# Patient Record
Sex: FEMALE | Race: BLACK OR AFRICAN AMERICAN | NOT HISPANIC OR LATINO | Employment: FULL TIME | URBAN - METROPOLITAN AREA
[De-identification: names, ages, dates, MRNs, and addresses within clinical notes are randomized per-mention and may not be internally consistent; named-entity substitution may affect disease eponyms.]

---

## 2019-08-13 ENCOUNTER — HISTORICAL (OUTPATIENT)
Dept: LAB | Facility: HOSPITAL | Age: 39
End: 2019-08-13

## 2019-08-13 LAB
ABS NEUT (OLG): 3.86 X10(3)/MCL (ref 2.1–9.2)
ALBUMIN SERPL-MCNC: 4.1 GM/DL (ref 3.4–5)
ALBUMIN/GLOB SERPL: 1.3 RATIO (ref 1.1–2)
ALP SERPL-CCNC: 53 UNIT/L (ref 38–126)
ALT SERPL-CCNC: 20 UNIT/L (ref 12–78)
APPEARANCE, UA: CLEAR
AST SERPL-CCNC: 13 UNIT/L (ref 15–37)
BACTERIA SPEC CULT: ABNORMAL /HPF
BASOPHILS # BLD AUTO: 0 X10(3)/MCL (ref 0–0.2)
BASOPHILS NFR BLD AUTO: 0 %
BILIRUB SERPL-MCNC: 1.6 MG/DL (ref 0.2–1)
BILIRUB UR QL STRIP: NEGATIVE
BILIRUBIN DIRECT+TOT PNL SERPL-MCNC: 0.3 MG/DL (ref 0–0.5)
BILIRUBIN DIRECT+TOT PNL SERPL-MCNC: 1.3 MG/DL (ref 0–0.8)
BUN SERPL-MCNC: 10 MG/DL (ref 7–18)
CALCIUM SERPL-MCNC: 8.7 MG/DL (ref 8.5–10.1)
CHLORIDE SERPL-SCNC: 107 MMOL/L (ref 98–107)
CHOLEST SERPL-MCNC: 154 MG/DL (ref 0–200)
CHOLEST/HDLC SERPL: 2.8 {RATIO} (ref 0–4)
CO2 SERPL-SCNC: 27 MMOL/L (ref 21–32)
COLOR UR: YELLOW
CREAT SERPL-MCNC: 0.9 MG/DL (ref 0.55–1.02)
DEPRECATED CALCIDIOL+CALCIFEROL SERPL-MC: 16.39 NG/ML (ref 30–80)
EOSINOPHIL # BLD AUTO: 0.1 X10(3)/MCL (ref 0–0.9)
EOSINOPHIL NFR BLD AUTO: 1 %
ERYTHROCYTE [DISTWIDTH] IN BLOOD BY AUTOMATED COUNT: 13.1 % (ref 11.5–17)
EST. AVERAGE GLUCOSE BLD GHB EST-MCNC: 94 MG/DL
GLOBULIN SER-MCNC: 3.2 GM/DL (ref 2.4–3.5)
GLUCOSE (UA): NEGATIVE
GLUCOSE SERPL-MCNC: 86 MG/DL (ref 74–106)
HBA1C MFR BLD: 4.9 % (ref 4.2–6.3)
HCT VFR BLD AUTO: 41.4 % (ref 37–47)
HDLC SERPL-MCNC: 56 MG/DL (ref 35–60)
HGB BLD-MCNC: 13.4 GM/DL (ref 12–16)
HGB UR QL STRIP: NEGATIVE
KETONES UR QL STRIP: ABNORMAL
LDLC SERPL CALC-MCNC: 88 MG/DL (ref 0–129)
LEUKOCYTE ESTERASE UR QL STRIP: NEGATIVE
LYMPHOCYTES # BLD AUTO: 2.2 X10(3)/MCL (ref 0.6–4.6)
LYMPHOCYTES NFR BLD AUTO: 34 %
MCH RBC QN AUTO: 29.3 PG (ref 27–31)
MCHC RBC AUTO-ENTMCNC: 32.4 GM/DL (ref 33–36)
MCV RBC AUTO: 90.4 FL (ref 80–94)
MONOCYTES # BLD AUTO: 0.4 X10(3)/MCL (ref 0.1–1.3)
MONOCYTES NFR BLD AUTO: 7 %
NEUTROPHILS # BLD AUTO: 3.86 X10(3)/MCL (ref 2.1–9.2)
NEUTROPHILS NFR BLD AUTO: 58 %
NITRITE UR QL STRIP: NEGATIVE
PH UR STRIP: 8 [PH] (ref 5–9)
PLATELET # BLD AUTO: 295 X10(3)/MCL (ref 130–400)
PMV BLD AUTO: 9.9 FL (ref 9.4–12.4)
POTASSIUM SERPL-SCNC: 3.9 MMOL/L (ref 3.5–5.1)
PROT SERPL-MCNC: 7.3 GM/DL (ref 6.4–8.2)
PROT UR QL STRIP: NEGATIVE
RBC # BLD AUTO: 4.58 X10(6)/MCL (ref 4.2–5.4)
RBC #/AREA URNS HPF: 5 /HPF (ref 0–2)
SODIUM SERPL-SCNC: 140 MMOL/L (ref 136–145)
SP GR UR STRIP: 1.02 (ref 1–1.03)
SQUAMOUS EPITHELIAL, UA: 9 /HPF (ref 0–4)
TRIGL SERPL-MCNC: 48 MG/DL (ref 30–150)
TSH SERPL-ACNC: 1.17 MIU/L (ref 0.36–3.74)
UROBILINOGEN UR STRIP-ACNC: 1
VLDLC SERPL CALC-MCNC: 10 MG/DL
WBC # SPEC AUTO: 6.7 X10(3)/MCL (ref 4.5–11.5)
WBC #/AREA URNS HPF: ABNORMAL /[HPF]

## 2020-09-17 ENCOUNTER — HISTORICAL (OUTPATIENT)
Dept: ADMINISTRATIVE | Facility: HOSPITAL | Age: 40
End: 2020-09-17

## 2020-09-17 LAB
ABS NEUT (OLG): 3.87 X10(3)/MCL (ref 2.1–9.2)
ALBUMIN SERPL-MCNC: 3.9 GM/DL (ref 3.5–5)
ALBUMIN/GLOB SERPL: 1.6 RATIO (ref 1.1–2)
ALP SERPL-CCNC: 50 UNIT/L (ref 40–150)
ALT SERPL-CCNC: 16 UNIT/L (ref 0–55)
APPEARANCE, UA: CLEAR
AST SERPL-CCNC: 15 UNIT/L (ref 5–34)
BACTERIA SPEC CULT: ABNORMAL /HPF
BASOPHILS # BLD AUTO: 0 X10(3)/MCL (ref 0–0.2)
BASOPHILS NFR BLD AUTO: 0 %
BILIRUB SERPL-MCNC: 1 MG/DL
BILIRUB UR QL STRIP: NEGATIVE
BILIRUBIN DIRECT+TOT PNL SERPL-MCNC: 0.4 MG/DL (ref 0–0.5)
BILIRUBIN DIRECT+TOT PNL SERPL-MCNC: 0.6 MG/DL (ref 0–0.8)
BUN SERPL-MCNC: 14 MG/DL (ref 7–18.7)
CALCIUM SERPL-MCNC: 8.6 MG/DL (ref 8.4–10.2)
CHLORIDE SERPL-SCNC: 106 MMOL/L (ref 98–107)
CHOLEST SERPL-MCNC: 131 MG/DL
CHOLEST/HDLC SERPL: 3 {RATIO} (ref 0–5)
CO2 SERPL-SCNC: 30 MMOL/L (ref 22–29)
COLOR UR: YELLOW
CREAT SERPL-MCNC: 0.8 MG/DL (ref 0.55–1.02)
DEPRECATED CALCIDIOL+CALCIFEROL SERPL-MC: 19.5 NG/ML (ref 6.6–49.9)
EOSINOPHIL # BLD AUTO: 0 X10(3)/MCL (ref 0–0.9)
EOSINOPHIL NFR BLD AUTO: 1 %
ERYTHROCYTE [DISTWIDTH] IN BLOOD BY AUTOMATED COUNT: 13.2 % (ref 11.5–17)
EST. AVERAGE GLUCOSE BLD GHB EST-MCNC: 96.8 MG/DL
GLOBULIN SER-MCNC: 2.4 GM/DL (ref 2.4–3.5)
GLUCOSE (UA): NEGATIVE
GLUCOSE SERPL-MCNC: 84 MG/DL (ref 74–100)
HBA1C MFR BLD: 5 %
HCT VFR BLD AUTO: 39.3 % (ref 37–47)
HDLC SERPL-MCNC: 43 MG/DL (ref 35–60)
HGB BLD-MCNC: 12.7 GM/DL (ref 12–16)
HGB UR QL STRIP: NEGATIVE
KETONES UR QL STRIP: NEGATIVE
LDLC SERPL CALC-MCNC: 76 MG/DL (ref 50–140)
LEUKOCYTE ESTERASE UR QL STRIP: NEGATIVE
LYMPHOCYTES # BLD AUTO: 1.7 X10(3)/MCL (ref 0.6–4.6)
LYMPHOCYTES NFR BLD AUTO: 28 %
MCH RBC QN AUTO: 29.5 PG (ref 27–31)
MCHC RBC AUTO-ENTMCNC: 32.3 GM/DL (ref 33–36)
MCV RBC AUTO: 91.4 FL (ref 80–94)
MONOCYTES # BLD AUTO: 0.5 X10(3)/MCL (ref 0.1–1.3)
MONOCYTES NFR BLD AUTO: 8 %
NEUTROPHILS # BLD AUTO: 3.87 X10(3)/MCL (ref 2.1–9.2)
NEUTROPHILS NFR BLD AUTO: 63 %
NITRITE UR QL STRIP: NEGATIVE
PH UR STRIP: 7 [PH] (ref 5–9)
PLATELET # BLD AUTO: 276 X10(3)/MCL (ref 130–400)
PMV BLD AUTO: 9.6 FL (ref 9.4–12.4)
POTASSIUM SERPL-SCNC: 3.7 MMOL/L (ref 3.5–5.1)
PROT SERPL-MCNC: 6.3 GM/DL (ref 6.4–8.3)
PROT UR QL STRIP: NEGATIVE
RBC # BLD AUTO: 4.3 X10(6)/MCL (ref 4.2–5.4)
RBC #/AREA URNS HPF: ABNORMAL /[HPF]
SODIUM SERPL-SCNC: 138 MMOL/L (ref 136–145)
SP GR UR STRIP: 1.02 (ref 1–1.03)
SQUAMOUS EPITHELIAL, UA: ABNORMAL
TRIGL SERPL-MCNC: 62 MG/DL (ref 37–140)
TSH SERPL-ACNC: 0.63 UIU/ML (ref 0.35–4.94)
UROBILINOGEN UR STRIP-ACNC: 1
VLDLC SERPL CALC-MCNC: 12 MG/DL
WBC # SPEC AUTO: 6.2 X10(3)/MCL (ref 4.5–11.5)
WBC #/AREA URNS HPF: ABNORMAL /[HPF]

## 2021-09-13 ENCOUNTER — HISTORICAL (OUTPATIENT)
Dept: ADMINISTRATIVE | Facility: HOSPITAL | Age: 41
End: 2021-09-13

## 2021-09-13 LAB
ABS NEUT (OLG): 4.34 X10(3)/MCL (ref 2.1–9.2)
ALBUMIN SERPL-MCNC: 4.5 GM/DL (ref 3.5–5)
ALBUMIN/GLOB SERPL: 1.5 RATIO (ref 1.1–2)
ALP SERPL-CCNC: 56 UNIT/L (ref 40–150)
ALT SERPL-CCNC: 10 UNIT/L (ref 0–55)
APPEARANCE, UA: CLEAR
AST SERPL-CCNC: 17 UNIT/L (ref 5–34)
BACTERIA SPEC CULT: ABNORMAL /HPF
BASOPHILS # BLD AUTO: 0 X10(3)/MCL (ref 0–0.2)
BASOPHILS NFR BLD AUTO: 0 %
BILIRUB SERPL-MCNC: 2.3 MG/DL
BILIRUB UR QL STRIP: NEGATIVE
BILIRUBIN DIRECT+TOT PNL SERPL-MCNC: 0.7 MG/DL (ref 0–0.5)
BILIRUBIN DIRECT+TOT PNL SERPL-MCNC: 1.6 MG/DL (ref 0–0.8)
BUN SERPL-MCNC: 12.4 MG/DL (ref 7–18.7)
CALCIUM SERPL-MCNC: 9.1 MG/DL (ref 8.4–10.2)
CHLORIDE SERPL-SCNC: 103 MMOL/L (ref 98–107)
CHOLEST SERPL-MCNC: 167 MG/DL
CHOLEST/HDLC SERPL: 3 {RATIO} (ref 0–5)
CO2 SERPL-SCNC: 25 MMOL/L (ref 22–29)
COLOR UR: YELLOW
CREAT SERPL-MCNC: 0.84 MG/DL (ref 0.55–1.02)
DEPRECATED CALCIDIOL+CALCIFEROL SERPL-MC: 22.6 NG/ML (ref 30–80)
EOSINOPHIL # BLD AUTO: 0 X10(3)/MCL (ref 0–0.9)
EOSINOPHIL NFR BLD AUTO: 0 %
ERYTHROCYTE [DISTWIDTH] IN BLOOD BY AUTOMATED COUNT: 13.1 % (ref 11.5–17)
EST. AVERAGE GLUCOSE BLD GHB EST-MCNC: 91.1 MG/DL
GLOBULIN SER-MCNC: 3.1 GM/DL (ref 2.4–3.5)
GLUCOSE (UA): NEGATIVE
GLUCOSE SERPL-MCNC: 84 MG/DL (ref 74–100)
HBA1C MFR BLD: 4.8 %
HCT VFR BLD AUTO: 41.7 % (ref 37–47)
HDLC SERPL-MCNC: 53 MG/DL (ref 35–60)
HGB BLD-MCNC: 13.4 GM/DL (ref 12–16)
HGB UR QL STRIP: ABNORMAL
KETONES UR QL STRIP: ABNORMAL
LDLC SERPL CALC-MCNC: 104 MG/DL (ref 50–140)
LEUKOCYTE ESTERASE UR QL STRIP: ABNORMAL
LYMPHOCYTES # BLD AUTO: 1.5 X10(3)/MCL (ref 0.6–4.6)
LYMPHOCYTES NFR BLD AUTO: 23 %
MCH RBC QN AUTO: 29.5 PG (ref 27–31)
MCHC RBC AUTO-ENTMCNC: 32.1 GM/DL (ref 33–36)
MCV RBC AUTO: 91.9 FL (ref 80–94)
MONOCYTES # BLD AUTO: 0.5 X10(3)/MCL (ref 0.1–1.3)
MONOCYTES NFR BLD AUTO: 7 %
NEUTROPHILS # BLD AUTO: 4.34 X10(3)/MCL (ref 2.1–9.2)
NEUTROPHILS NFR BLD AUTO: 69 %
NITRITE UR QL STRIP: NEGATIVE
PH UR STRIP: 6.5 [PH] (ref 5–9)
PLATELET # BLD AUTO: 335 X10(3)/MCL (ref 130–400)
PMV BLD AUTO: 9.9 FL (ref 9.4–12.4)
POTASSIUM SERPL-SCNC: 3.8 MMOL/L (ref 3.5–5.1)
PROT SERPL-MCNC: 7.6 GM/DL (ref 6.4–8.3)
PROT UR QL STRIP: NEGATIVE
RBC # BLD AUTO: 4.54 X10(6)/MCL (ref 4.2–5.4)
RBC #/AREA URNS HPF: ABNORMAL /[HPF]
SODIUM SERPL-SCNC: 136 MMOL/L (ref 136–145)
SP GR UR STRIP: 1.02 (ref 1–1.03)
SQUAMOUS EPITHELIAL, UA: ABNORMAL /HPF (ref 0–4)
TRIGL SERPL-MCNC: 48 MG/DL (ref 37–140)
TSH SERPL-ACNC: 0.71 UIU/ML (ref 0.35–4.94)
UROBILINOGEN UR STRIP-ACNC: 0.2
VLDLC SERPL CALC-MCNC: 10 MG/DL
WBC # SPEC AUTO: 6.3 X10(3)/MCL (ref 4.5–11.5)
WBC #/AREA URNS HPF: 5 /HPF (ref 0–3)

## 2021-09-20 ENCOUNTER — HISTORICAL (OUTPATIENT)
Dept: ADMINISTRATIVE | Facility: HOSPITAL | Age: 41
End: 2021-09-20

## 2021-09-20 LAB
ALBUMIN SERPL-MCNC: 4.6 GM/DL (ref 3.5–5)
ALBUMIN/GLOB SERPL: 1.5 RATIO (ref 1.1–2)
ALP SERPL-CCNC: 54 UNIT/L (ref 40–150)
ALT SERPL-CCNC: 11 UNIT/L (ref 0–55)
APPEARANCE, UA: CLEAR
AST SERPL-CCNC: 17 UNIT/L (ref 5–34)
BACTERIA SPEC CULT: ABNORMAL /HPF
BILIRUB SERPL-MCNC: 1.6 MG/DL
BILIRUB UR QL STRIP: NEGATIVE
BILIRUBIN DIRECT+TOT PNL SERPL-MCNC: 0.5 MG/DL (ref 0–0.5)
BILIRUBIN DIRECT+TOT PNL SERPL-MCNC: 1.1 MG/DL (ref 0–0.8)
BUN SERPL-MCNC: 11.1 MG/DL (ref 7–18.7)
CALCIUM SERPL-MCNC: 9.5 MG/DL (ref 8.4–10.2)
CHLORIDE SERPL-SCNC: 103 MMOL/L (ref 98–107)
CO2 SERPL-SCNC: 25 MMOL/L (ref 22–29)
COLOR UR: YELLOW
CREAT SERPL-MCNC: 0.92 MG/DL (ref 0.55–1.02)
GLOBULIN SER-MCNC: 3.1 GM/DL (ref 2.4–3.5)
GLUCOSE (UA): NEGATIVE
GLUCOSE SERPL-MCNC: 84 MG/DL (ref 74–100)
HGB UR QL STRIP: NEGATIVE
KETONES UR QL STRIP: ABNORMAL
LEUKOCYTE ESTERASE UR QL STRIP: NEGATIVE
NITRITE UR QL STRIP: NEGATIVE
PH UR STRIP: 6 [PH] (ref 5–9)
POTASSIUM SERPL-SCNC: 3.6 MMOL/L (ref 3.5–5.1)
PROT SERPL-MCNC: 7.7 GM/DL (ref 6.4–8.3)
PROT UR QL STRIP: NEGATIVE
RBC #/AREA URNS HPF: ABNORMAL /[HPF]
SODIUM SERPL-SCNC: 138 MMOL/L (ref 136–145)
SP GR UR STRIP: 1.02 (ref 1–1.03)
SQUAMOUS EPITHELIAL, UA: ABNORMAL /HPF (ref 0–4)
UROBILINOGEN UR STRIP-ACNC: 0.2
WBC #/AREA URNS HPF: ABNORMAL /[HPF]

## 2022-04-10 ENCOUNTER — HISTORICAL (OUTPATIENT)
Dept: ADMINISTRATIVE | Facility: HOSPITAL | Age: 42
End: 2022-04-10

## 2022-04-26 VITALS
OXYGEN SATURATION: 98 % | WEIGHT: 134 LBS | HEIGHT: 64 IN | DIASTOLIC BLOOD PRESSURE: 86 MMHG | SYSTOLIC BLOOD PRESSURE: 116 MMHG | BODY MASS INDEX: 22.88 KG/M2

## 2022-09-21 ENCOUNTER — OFFICE VISIT (OUTPATIENT)
Dept: FAMILY MEDICINE | Facility: CLINIC | Age: 42
End: 2022-09-21
Payer: COMMERCIAL

## 2022-09-21 ENCOUNTER — LAB VISIT (OUTPATIENT)
Dept: LAB | Facility: HOSPITAL | Age: 42
End: 2022-09-21
Attending: FAMILY MEDICINE
Payer: COMMERCIAL

## 2022-09-21 VITALS
DIASTOLIC BLOOD PRESSURE: 74 MMHG | RESPIRATION RATE: 18 BRPM | TEMPERATURE: 98 F | WEIGHT: 141 LBS | BODY MASS INDEX: 24.98 KG/M2 | SYSTOLIC BLOOD PRESSURE: 110 MMHG | HEART RATE: 74 BPM | HEIGHT: 63 IN | OXYGEN SATURATION: 98 %

## 2022-09-21 DIAGNOSIS — Z00.00 WELLNESS EXAMINATION: Primary | ICD-10-CM

## 2022-09-21 DIAGNOSIS — Z00.00 WELLNESS EXAMINATION: ICD-10-CM

## 2022-09-21 DIAGNOSIS — E55.9 VITAMIN D DEFICIENCY: ICD-10-CM

## 2022-09-21 LAB
ALBUMIN SERPL-MCNC: 4.1 GM/DL (ref 3.5–5)
ALBUMIN/GLOB SERPL: 1.2 RATIO (ref 1.1–2)
ALP SERPL-CCNC: 57 UNIT/L (ref 40–150)
ALT SERPL-CCNC: 17 UNIT/L (ref 0–55)
APPEARANCE UR: CLEAR
AST SERPL-CCNC: 16 UNIT/L (ref 5–34)
BACTERIA #/AREA URNS AUTO: NORMAL /HPF
BASOPHILS # BLD AUTO: 0.02 X10(3)/MCL (ref 0–0.2)
BASOPHILS NFR BLD AUTO: 0.2 %
BILIRUB UR QL STRIP.AUTO: NEGATIVE MG/DL
BILIRUBIN DIRECT+TOT PNL SERPL-MCNC: 1.8 MG/DL
BUN SERPL-MCNC: 10.2 MG/DL (ref 7–18.7)
CALCIUM SERPL-MCNC: 9.1 MG/DL (ref 8.4–10.2)
CHLORIDE SERPL-SCNC: 102 MMOL/L (ref 98–107)
CHOLEST SERPL-MCNC: 147 MG/DL
CHOLEST/HDLC SERPL: 3 {RATIO} (ref 0–5)
CO2 SERPL-SCNC: 26 MMOL/L (ref 22–29)
COLOR UR AUTO: YELLOW
CREAT SERPL-MCNC: 0.81 MG/DL (ref 0.55–1.02)
EOSINOPHIL # BLD AUTO: 0.05 X10(3)/MCL (ref 0–0.9)
EOSINOPHIL NFR BLD AUTO: 0.6 %
ERYTHROCYTE [DISTWIDTH] IN BLOOD BY AUTOMATED COUNT: 13.1 % (ref 11.5–17)
EST. AVERAGE GLUCOSE BLD GHB EST-MCNC: 91.1 MG/DL
GFR SERPLBLD CREATININE-BSD FMLA CKD-EPI: >60 MLS/MIN/1.73/M2
GLOBULIN SER-MCNC: 3.3 GM/DL (ref 2.4–3.5)
GLUCOSE SERPL-MCNC: 81 MG/DL (ref 74–100)
GLUCOSE UR QL STRIP.AUTO: NEGATIVE MG/DL
HBA1C MFR BLD: 4.8 %
HCT VFR BLD AUTO: 40.8 % (ref 37–47)
HDLC SERPL-MCNC: 50 MG/DL (ref 35–60)
HGB BLD-MCNC: 13 GM/DL (ref 12–16)
IMM GRANULOCYTES # BLD AUTO: 0.01 X10(3)/MCL (ref 0–0.04)
IMM GRANULOCYTES NFR BLD AUTO: 0.1 %
KETONES UR QL STRIP.AUTO: NEGATIVE MG/DL
LDLC SERPL CALC-MCNC: 77 MG/DL (ref 50–140)
LEUKOCYTE ESTERASE UR QL STRIP.AUTO: NEGATIVE UNIT/L
LYMPHOCYTES # BLD AUTO: 2.18 X10(3)/MCL (ref 0.6–4.6)
LYMPHOCYTES NFR BLD AUTO: 24.1 %
MCH RBC QN AUTO: 28.8 PG (ref 27–31)
MCHC RBC AUTO-ENTMCNC: 31.9 MG/DL (ref 33–36)
MCV RBC AUTO: 90.3 FL (ref 80–94)
MONOCYTES # BLD AUTO: 0.59 X10(3)/MCL (ref 0.1–1.3)
MONOCYTES NFR BLD AUTO: 6.5 %
NEUTROPHILS # BLD AUTO: 6.2 X10(3)/MCL (ref 2.1–9.2)
NEUTROPHILS NFR BLD AUTO: 68.5 %
NITRITE UR QL STRIP.AUTO: NEGATIVE
PH UR STRIP.AUTO: 6.5 [PH]
PLATELET # BLD AUTO: 318 X10(3)/MCL (ref 130–400)
PMV BLD AUTO: 8.9 FL (ref 7.4–10.4)
POTASSIUM SERPL-SCNC: 3.7 MMOL/L (ref 3.5–5.1)
PROT SERPL-MCNC: 7.4 GM/DL (ref 6.4–8.3)
PROT UR QL STRIP.AUTO: NEGATIVE MG/DL
RBC # BLD AUTO: 4.52 X10(6)/MCL (ref 4.2–5.4)
RBC #/AREA URNS AUTO: NORMAL /HPF
RBC UR QL AUTO: ABNORMAL UNIT/L
SODIUM SERPL-SCNC: 137 MMOL/L (ref 136–145)
SP GR UR STRIP.AUTO: 1.01
SQUAMOUS #/AREA URNS AUTO: NORMAL /HPF
TRIGL SERPL-MCNC: 100 MG/DL (ref 37–140)
TSH SERPL-ACNC: 0.99 UIU/ML (ref 0.35–4.94)
UROBILINOGEN UR STRIP-ACNC: 0.2 MG/DL
VLDLC SERPL CALC-MCNC: 20 MG/DL
WBC # SPEC AUTO: 9 X10(3)/MCL (ref 4.5–11.5)
WBC #/AREA URNS AUTO: NORMAL /HPF

## 2022-09-21 PROCEDURE — 1160F RVW MEDS BY RX/DR IN RCRD: CPT | Mod: CPTII,,, | Performed by: FAMILY MEDICINE

## 2022-09-21 PROCEDURE — 3074F SYST BP LT 130 MM HG: CPT | Mod: CPTII,,, | Performed by: FAMILY MEDICINE

## 2022-09-21 PROCEDURE — 81001 URINALYSIS AUTO W/SCOPE: CPT

## 2022-09-21 PROCEDURE — 83036 HEMOGLOBIN GLYCOSYLATED A1C: CPT

## 2022-09-21 PROCEDURE — 3078F PR MOST RECENT DIASTOLIC BLOOD PRESSURE < 80 MM HG: ICD-10-PCS | Mod: CPTII,,, | Performed by: FAMILY MEDICINE

## 2022-09-21 PROCEDURE — 80061 LIPID PANEL: CPT

## 2022-09-21 PROCEDURE — 3078F DIAST BP <80 MM HG: CPT | Mod: CPTII,,, | Performed by: FAMILY MEDICINE

## 2022-09-21 PROCEDURE — 3008F BODY MASS INDEX DOCD: CPT | Mod: CPTII,,, | Performed by: FAMILY MEDICINE

## 2022-09-21 PROCEDURE — 85025 COMPLETE CBC W/AUTO DIFF WBC: CPT

## 2022-09-21 PROCEDURE — 1159F PR MEDICATION LIST DOCUMENTED IN MEDICAL RECORD: ICD-10-PCS | Mod: CPTII,,, | Performed by: FAMILY MEDICINE

## 2022-09-21 PROCEDURE — 1160F PR REVIEW ALL MEDS BY PRESCRIBER/CLIN PHARMACIST DOCUMENTED: ICD-10-PCS | Mod: CPTII,,, | Performed by: FAMILY MEDICINE

## 2022-09-21 PROCEDURE — 99396 PR PREVENTIVE VISIT,EST,40-64: ICD-10-PCS | Mod: ,,, | Performed by: FAMILY MEDICINE

## 2022-09-21 PROCEDURE — 36415 COLL VENOUS BLD VENIPUNCTURE: CPT

## 2022-09-21 PROCEDURE — 1159F MED LIST DOCD IN RCRD: CPT | Mod: CPTII,,, | Performed by: FAMILY MEDICINE

## 2022-09-21 PROCEDURE — 3008F PR BODY MASS INDEX (BMI) DOCUMENTED: ICD-10-PCS | Mod: CPTII,,, | Performed by: FAMILY MEDICINE

## 2022-09-21 PROCEDURE — 84443 ASSAY THYROID STIM HORMONE: CPT

## 2022-09-21 PROCEDURE — 82306 VITAMIN D 25 HYDROXY: CPT

## 2022-09-21 PROCEDURE — 80053 COMPREHEN METABOLIC PANEL: CPT

## 2022-09-21 PROCEDURE — 3074F PR MOST RECENT SYSTOLIC BLOOD PRESSURE < 130 MM HG: ICD-10-PCS | Mod: CPTII,,, | Performed by: FAMILY MEDICINE

## 2022-09-21 PROCEDURE — 99396 PREV VISIT EST AGE 40-64: CPT | Mod: ,,, | Performed by: FAMILY MEDICINE

## 2022-09-21 NOTE — PROGRESS NOTES
Patient ID: [id number]     Chief Complaint: Annual Exam        HPI:     Kathy Mitchell is a 42 y.o. female here today for annual wellness exam.  Well Adult History   The patient presents for well adult exam. The patient's general health status is described as good. The patient's diet is described as balanced. Exercise: occasional. Associated symptoms consist of denies weight loss, denies weight gain, denies fatigue, denies headache, denies hearing loss and denies vision changes. Last menstrual period: Has Mirena IUD followed by GYN. Additional pertinent history: last dental exam: goes every 6 months, last eye exam: 10/2021 (wears contacts/eye glasses), last MMG was 02/28/2022 at [facility], last pap smear : 10/19/2021 (WNL with GYN- Dr. Chidi Akins), seat belt use, occasional caffeine use, tobacco use none, social alcohol use and She needs orders for labs. She is not interested in STD screening today. She is UTD on vaccines. She refuses Tdap vaccine today, risks of refusal discussed with patient and patient voices understanding. She will return for flu vaccine once available.  - Patient is without any other complaints today.     Advance Care Planning     Date: 09/21/2022  Patient did not wish or was not able to name a surrogate decision maker or provide an Advance Care Plan, she will discuss with her  first and get back with our office.           ----------------------------  Vitamin D deficiency     History reviewed. No pertinent surgical history.    Review of patient's allergies indicates:  No Known Allergies    No outpatient medications have been marked as taking for the 9/21/22 encounter (Office Visit) with Judit Hodgson MD.       Social History     Socioeconomic History    Marital status:    Tobacco Use    Smoking status: Never    Smokeless tobacco: Never   Substance and Sexual Activity    Alcohol use: Yes    Drug use: Never    Sexual activity: Yes        Family  "History   Problem Relation Age of Onset    Hypertension Father         Subjective:       Review of Systems:    See HPI for details    Constitutional: No fever, No chills, No fatigue.   Eye: No blurring, No visual disturbances.   Ear/Nose/Mouth/Throat: No decreased hearing, No ear pain, No nasal congestion, No sore throat.   Respiratory: No shortness of breath, No cough, No wheezing.   Cardiovascular: No chest pain, No palpitations, No peripheral edema.   Breast: Both breasts, No lump/ mass, No pain.   Nipple discharge: None.   Gastrointestinal: No nausea, No vomiting, No diarrhea, No constipation, No abdominal pain.   Genitourinary: No dysuria, No hematuria.   Gynecologic: Negative except as documented in history of present illness.   Hematology/Lymphatics: No bruising tendency, No bleeding tendency, No swollen lymph glands.   Endocrine: No excessive thirst, No polyuria, No excessive hunger.   Musculoskeletal: No joint pain, No muscle pain, No decreased range of motion.   Integumentary: No rash, No pruritus.   Neurologic: No abnormal balance, No confusion, No headache.   Psychiatric: No anxiety, No depression, Not suicidal, No hallucinations.     All Other ROS: Negative except as stated in HPI.       Objective:     /74 (BP Location: Left arm, Patient Position: Sitting, BP Method: Medium (Automatic))   Pulse 74   Temp 98.1 °F (36.7 °C) (Oral)   Resp 18   Ht 5' 3" (1.6 m)   Wt 64 kg (141 lb)   SpO2 98%   BMI 24.98 kg/m²     Physical Exam    General: Alert and oriented, No acute distress.  Head: Normocephalic, Atraumatic.  Eye: Pupils are equal, round and reactive to light, Extraocular movements are intact, Sclera non-icteric.  Ears/Nose/Throat: Normal, Mucosa moist,Clear.  Neck/Thyroid: Supple, Non-tender, No carotid bruit, No palpable thyromegaly or thyroid nodule, No lymphadenopathy, No JVD, Full range of motion.  Respiratory: Clear to auscultation bilaterally; No wheezes, rales or rhonchi,Non-labored " respirations, Symmetrical chest wall expansion.  Cardiovascular: Regular rate and rhythm, S1/S2 normal, No murmurs, rubs or gallops.  Gastrointestinal: Soft, Non-tender, Non-distended, Normal bowel sounds, No palpable organomegaly.  Musculoskeletal: Normal range of motion.  Integumentary: Warm, Dry, Intact, No suspicious lesions or rashes.  Extremities: No clubbing, cyanosis or edema  Neurologic: No focal deficits, Cranial Nerves II-XII are grossly intact, Motor strength normal upper and lower extremities, Sensory exam intact.  Psychiatric: Normal interaction, Coherent speech, Euthymic mood, Appropriate affect         Assessment:       ICD-10-CM ICD-9-CM   1. Wellness examination  Z00.00 V70.0   2. Vitamin D deficiency  E55.9 268.9        Plan:     Problem List Items Addressed This Visit    None  Visit Diagnoses       Wellness examination    -  Primary    Relevant Orders    CBC Auto Differential    Comprehensive Metabolic Panel    Hemoglobin A1C    Lipid Panel    TSH    Urinalysis, Reflex to Urine Culture Urine, Clean Catch    Vitamin D deficiency        Relevant Orders    Vitamin D         1. Wellness examination  - CBC Auto Differential; Future  - Comprehensive Metabolic Panel; Future  - Hemoglobin A1C; Future  - Lipid Panel; Future  - TSH; Future  - Urinalysis, Reflex to Urine Culture Urine, Clean Catch; Future  - Will treat pending lab results. Monthly breast self exam encouraged. Diet, exercise, and 10% weight loss encouraged. Keep appointment for dental exams x q6 months as scheduled. Keep appointment for annual eye exam as scheduled. Keep appointment with GYN for annual pap smear, MMG as scheduled. Notify M.D. or ER if temp greater than 100.4, or any acute illness.      2. Vitamin D deficiency  - Vitamin D; Future      Kathy was seen today for annual exam.    Diagnoses and all orders for this visit:    Wellness examination  -     CBC Auto Differential; Future  -     Comprehensive Metabolic Panel; Future  -      Hemoglobin A1C; Future  -     Lipid Panel; Future  -     TSH; Future  -     Urinalysis, Reflex to Urine Culture Urine, Clean Catch; Future    Vitamin D deficiency  -     Vitamin D; Future               Follow up in about 1 year (around 9/21/2023) for Wellness.

## 2022-09-22 LAB — DEPRECATED CALCIDIOL+CALCIFEROL SERPL-MC: 30.3 NG/ML (ref 30–80)

## 2023-03-06 LAB — BCS RECOMMENDATION EXT: NORMAL

## 2023-03-07 ENCOUNTER — DOCUMENTATION ONLY (OUTPATIENT)
Dept: FAMILY MEDICINE | Facility: CLINIC | Age: 43
End: 2023-03-07
Payer: COMMERCIAL

## 2023-09-18 DIAGNOSIS — Z00.00 BLOOD TESTS FOR ROUTINE GENERAL PHYSICAL EXAMINATION: Primary | ICD-10-CM

## 2023-09-21 ENCOUNTER — LAB VISIT (OUTPATIENT)
Dept: LAB | Facility: HOSPITAL | Age: 43
End: 2023-09-21
Attending: FAMILY MEDICINE
Payer: COMMERCIAL

## 2023-09-21 DIAGNOSIS — Z00.00 BLOOD TESTS FOR ROUTINE GENERAL PHYSICAL EXAMINATION: ICD-10-CM

## 2023-09-21 LAB
ALBUMIN SERPL-MCNC: 4 G/DL (ref 3.5–5)
ALBUMIN/GLOB SERPL: 1.4 RATIO (ref 1.1–2)
ALP SERPL-CCNC: 51 UNIT/L (ref 40–150)
ALT SERPL-CCNC: 13 UNIT/L (ref 0–55)
APPEARANCE UR: CLEAR
AST SERPL-CCNC: 12 UNIT/L (ref 5–34)
BACTERIA #/AREA URNS AUTO: NORMAL /HPF
BASOPHILS # BLD AUTO: 0.02 X10(3)/MCL
BASOPHILS NFR BLD AUTO: 0.3 %
BILIRUB SERPL-MCNC: 1.4 MG/DL
BILIRUB UR QL STRIP.AUTO: NEGATIVE
BUN SERPL-MCNC: 9.6 MG/DL (ref 7–18.7)
CALCIUM SERPL-MCNC: 9.3 MG/DL (ref 8.4–10.2)
CHLORIDE SERPL-SCNC: 106 MMOL/L (ref 98–107)
CHOLEST SERPL-MCNC: 139 MG/DL
CHOLEST/HDLC SERPL: 4 {RATIO} (ref 0–5)
CO2 SERPL-SCNC: 27 MMOL/L (ref 22–29)
COLOR UR: YELLOW
CREAT SERPL-MCNC: 0.89 MG/DL (ref 0.55–1.02)
EOSINOPHIL # BLD AUTO: 0.06 X10(3)/MCL (ref 0–0.9)
EOSINOPHIL NFR BLD AUTO: 0.8 %
ERYTHROCYTE [DISTWIDTH] IN BLOOD BY AUTOMATED COUNT: 13.2 % (ref 11.5–17)
EST. AVERAGE GLUCOSE BLD GHB EST-MCNC: 93.9 MG/DL
GFR SERPLBLD CREATININE-BSD FMLA CKD-EPI: >60 MLS/MIN/1.73/M2
GLOBULIN SER-MCNC: 2.9 GM/DL (ref 2.4–3.5)
GLUCOSE SERPL-MCNC: 95 MG/DL (ref 74–100)
GLUCOSE UR QL STRIP.AUTO: NEGATIVE
HBA1C MFR BLD: 4.9 %
HCT VFR BLD AUTO: 37.2 % (ref 37–47)
HDLC SERPL-MCNC: 39 MG/DL (ref 35–60)
HGB BLD-MCNC: 11.9 G/DL (ref 12–16)
IMM GRANULOCYTES # BLD AUTO: 0.01 X10(3)/MCL (ref 0–0.04)
IMM GRANULOCYTES NFR BLD AUTO: 0.1 %
KETONES UR QL STRIP.AUTO: NEGATIVE
LDLC SERPL CALC-MCNC: 87 MG/DL (ref 50–140)
LEUKOCYTE ESTERASE UR QL STRIP.AUTO: ABNORMAL
LYMPHOCYTES # BLD AUTO: 2.1 X10(3)/MCL (ref 0.6–4.6)
LYMPHOCYTES NFR BLD AUTO: 28.8 %
MCH RBC QN AUTO: 29 PG (ref 27–31)
MCHC RBC AUTO-ENTMCNC: 32 G/DL (ref 33–36)
MCV RBC AUTO: 90.5 FL (ref 80–94)
MONOCYTES # BLD AUTO: 0.58 X10(3)/MCL (ref 0.1–1.3)
MONOCYTES NFR BLD AUTO: 8 %
NEUTROPHILS # BLD AUTO: 4.52 X10(3)/MCL (ref 2.1–9.2)
NEUTROPHILS NFR BLD AUTO: 62 %
NITRITE UR QL STRIP.AUTO: NEGATIVE
PH UR STRIP.AUTO: 6 [PH]
PLATELET # BLD AUTO: 308 X10(3)/MCL (ref 130–400)
PMV BLD AUTO: 8.8 FL (ref 7.4–10.4)
POTASSIUM SERPL-SCNC: 4.1 MMOL/L (ref 3.5–5.1)
PROT SERPL-MCNC: 6.9 GM/DL (ref 6.4–8.3)
PROT UR QL STRIP.AUTO: NEGATIVE
RBC # BLD AUTO: 4.11 X10(6)/MCL (ref 4.2–5.4)
RBC #/AREA URNS AUTO: NORMAL /HPF
RBC UR QL AUTO: ABNORMAL
SODIUM SERPL-SCNC: 139 MMOL/L (ref 136–145)
SP GR UR STRIP.AUTO: 1.02 (ref 1–1.03)
SQUAMOUS #/AREA URNS AUTO: NORMAL /HPF
TRIGL SERPL-MCNC: 65 MG/DL (ref 37–140)
TSH SERPL-ACNC: 1.27 UIU/ML (ref 0.35–4.94)
UROBILINOGEN UR STRIP-ACNC: 0.2
VLDLC SERPL CALC-MCNC: 13 MG/DL
WBC # SPEC AUTO: 7.29 X10(3)/MCL (ref 4.5–11.5)
WBC #/AREA URNS AUTO: NORMAL /HPF

## 2023-09-21 PROCEDURE — 85025 COMPLETE CBC W/AUTO DIFF WBC: CPT

## 2023-09-21 PROCEDURE — 83036 HEMOGLOBIN GLYCOSYLATED A1C: CPT

## 2023-09-21 PROCEDURE — 84443 ASSAY THYROID STIM HORMONE: CPT

## 2023-09-21 PROCEDURE — 80061 LIPID PANEL: CPT

## 2023-09-21 PROCEDURE — 82306 VITAMIN D 25 HYDROXY: CPT

## 2023-09-21 PROCEDURE — 36415 COLL VENOUS BLD VENIPUNCTURE: CPT

## 2023-09-21 PROCEDURE — 80053 COMPREHEN METABOLIC PANEL: CPT

## 2023-09-22 LAB — DEPRECATED CALCIDIOL+CALCIFEROL SERPL-MC: 41.5 NG/ML (ref 30–80)

## 2023-09-25 ENCOUNTER — OFFICE VISIT (OUTPATIENT)
Dept: FAMILY MEDICINE | Facility: CLINIC | Age: 43
End: 2023-09-25
Payer: COMMERCIAL

## 2023-09-25 VITALS
RESPIRATION RATE: 17 BRPM | WEIGHT: 141.13 LBS | BODY MASS INDEX: 24.99 KG/M2 | DIASTOLIC BLOOD PRESSURE: 76 MMHG | HEART RATE: 66 BPM | OXYGEN SATURATION: 99 % | SYSTOLIC BLOOD PRESSURE: 111 MMHG

## 2023-09-25 DIAGNOSIS — Z12.11 SCREENING FOR COLON CANCER: ICD-10-CM

## 2023-09-25 DIAGNOSIS — R31.1 BENIGN MICROSCOPIC HEMATURIA: ICD-10-CM

## 2023-09-25 DIAGNOSIS — Z12.31 VISIT FOR SCREENING MAMMOGRAM: ICD-10-CM

## 2023-09-25 DIAGNOSIS — Z00.00 WELLNESS EXAMINATION: Primary | ICD-10-CM

## 2023-09-25 PROCEDURE — 3008F BODY MASS INDEX DOCD: CPT | Mod: CPTII,,, | Performed by: FAMILY MEDICINE

## 2023-09-25 PROCEDURE — 3078F PR MOST RECENT DIASTOLIC BLOOD PRESSURE < 80 MM HG: ICD-10-PCS | Mod: CPTII,,, | Performed by: FAMILY MEDICINE

## 2023-09-25 PROCEDURE — 3044F PR MOST RECENT HEMOGLOBIN A1C LEVEL <7.0%: ICD-10-PCS | Mod: CPTII,,, | Performed by: FAMILY MEDICINE

## 2023-09-25 PROCEDURE — 3044F HG A1C LEVEL LT 7.0%: CPT | Mod: CPTII,,, | Performed by: FAMILY MEDICINE

## 2023-09-25 PROCEDURE — 1159F MED LIST DOCD IN RCRD: CPT | Mod: CPTII,,, | Performed by: FAMILY MEDICINE

## 2023-09-25 PROCEDURE — 3074F PR MOST RECENT SYSTOLIC BLOOD PRESSURE < 130 MM HG: ICD-10-PCS | Mod: CPTII,,, | Performed by: FAMILY MEDICINE

## 2023-09-25 PROCEDURE — 99396 PR PREVENTIVE VISIT,EST,40-64: ICD-10-PCS | Mod: ,,, | Performed by: FAMILY MEDICINE

## 2023-09-25 PROCEDURE — 99396 PREV VISIT EST AGE 40-64: CPT | Mod: ,,, | Performed by: FAMILY MEDICINE

## 2023-09-25 PROCEDURE — 1159F PR MEDICATION LIST DOCUMENTED IN MEDICAL RECORD: ICD-10-PCS | Mod: CPTII,,, | Performed by: FAMILY MEDICINE

## 2023-09-25 PROCEDURE — 1160F RVW MEDS BY RX/DR IN RCRD: CPT | Mod: CPTII,,, | Performed by: FAMILY MEDICINE

## 2023-09-25 PROCEDURE — 3008F PR BODY MASS INDEX (BMI) DOCUMENTED: ICD-10-PCS | Mod: CPTII,,, | Performed by: FAMILY MEDICINE

## 2023-09-25 PROCEDURE — 1160F PR REVIEW ALL MEDS BY PRESCRIBER/CLIN PHARMACIST DOCUMENTED: ICD-10-PCS | Mod: CPTII,,, | Performed by: FAMILY MEDICINE

## 2023-09-25 PROCEDURE — 3074F SYST BP LT 130 MM HG: CPT | Mod: CPTII,,, | Performed by: FAMILY MEDICINE

## 2023-09-25 PROCEDURE — 3078F DIAST BP <80 MM HG: CPT | Mod: CPTII,,, | Performed by: FAMILY MEDICINE

## 2023-09-25 NOTE — PROGRESS NOTES
Patient ID: [id number]     Chief Complaint: Annual Exam        HPI:     Kathy Mitchell is a 43 y.o. female here today for annual wellness exam.   Well Adult History   The patient presents for well adult exam. The patient's general health status is described as good. The patient's diet is described as balanced. Exercise: occasional. Associated symptoms consist of denies weight loss, denies weight gain, denies fatigue, denies headache, denies hearing loss and denies vision changes. Last menstrual period: Has Mirena IUD followed by GYN. Additional pertinent history: last dental exam: goes every 6 months, last eye exam: > 1 year ago (wears contacts/eye glasses, she has vision insurance, she will schedule an appointment next available), last MMG was 03/06/2023 at [facility], last pap smear : 12/08/2022 (WNL with GYN- Dr. Chidi Akins), She would like Cologuard instead of Colonoscopy, she denies family history of colon cancer or colon polyps, seat belt use, occasional caffeine use, tobacco use none, social alcohol use and She had labs done on 09/21/2023, here to discuss the results today. She is not interested in STD screening today. She is UTD on vaccines. She refuses Tdap, COVID-19 booster, and flu vaccine today, risks of refusal discussed with patient and patient voices understanding.   - Patient is without any other complaints today.     Advance Care Planning     Date: 09/25/2023  Patient did not wish or was not able to name a surrogate decision maker or provide an Advance Care Plan.             ----------------------------  Vitamin D deficiency     History reviewed. No pertinent surgical history.    Review of patient's allergies indicates:  No Known Allergies    No outpatient medications have been marked as taking for the 9/25/23 encounter (Office Visit) with Judit Hodgson MD.       Social History     Socioeconomic History    Marital status:    Tobacco Use    Smoking status:  Never    Smokeless tobacco: Never   Substance and Sexual Activity    Alcohol use: Yes    Drug use: Never    Sexual activity: Yes        Family History   Problem Relation Age of Onset    Hypertension Father         Subjective:       Review of Systems:    See HPI for details    Constitutional: Denies Change in appetite. Denies Chills. Denies Fever. Denies Night sweats.  Eye: Denies Blurred vision. Denies Discharge. Denies Eye pain.  ENT: Denies Decreased hearing. Denies Sore throat. Denies Swollen glands.  Respiratory: Denies Cough. Denies Shortness of breath. Denies Shortness of breath with exertion. Denies Wheezing.  Cardiovascular: Denies Chest pain at rest. Denies Chest pain with exertion. Denies Irregular heartbeat. Denies Palpitations.  Gastrointestinal: Denies Abdominal pain. Denies Diarrhea. Denies Nausea. Denies Vomiting. Denies Hematemesis or Hematochezia.  Genitourinary: Denies Dysuria. Denies Urinary frequency. Denies Urinary urgency. Denies Blood in urine.  Endocrine: Denies Cold intolerance. Denies Excessive thirst. Denies Heat intolerance. Denies Weight loss. Denies Weight gain.  Musculoskeletal: Denies Painful joints. Denies Weakness.  Integumentary: Denies Rash. Denies Itching. Denies Dry skin.  Neurologic: Denies Dizziness. Denies Fainting. Denies Headache.  Psychiatric: Denies Depression. Denies Anxiety. Denies Suicidal/Homicidal ideations.    All Other ROS: Negative except as stated in HPI.       Objective:     /76 (BP Location: Right arm, Patient Position: Sitting, BP Method: Medium (Automatic))   Pulse 66   Resp 17   Wt 64 kg (141 lb 1.6 oz)   LMP  (LMP Unknown)   SpO2 99%   BMI 24.99 kg/m²     Physical Exam    General: Alert and oriented, No acute distress.  Head: Normocephalic, Atraumatic.  Eye: Pupils are equal, round and reactive to light, Extraocular movements are intact, Sclera non-icteric.  Ears/Nose/Throat: Normal, Mucosa moist,Clear.  Neck/Thyroid: Supple, Non-tender, No  carotid bruit, No palpable thyromegaly or thyroid nodule, No lymphadenopathy, No JVD, Full range of motion.  Respiratory: Clear to auscultation bilaterally; No wheezes, rales or rhonchi,Non-labored respirations, Symmetrical chest wall expansion.  Cardiovascular: Regular rate and rhythm, S1/S2 normal, No murmurs, rubs or gallops.  Gastrointestinal: Soft, Non-tender, Non-distended, Normal bowel sounds, No palpable organomegaly.  Musculoskeletal: Normal range of motion.  Integumentary: Warm, Dry, Intact, No suspicious lesions or rashes.  Extremities: No clubbing, cyanosis or edema  Neurologic: No focal deficits, Cranial Nerves II-XII are grossly intact, Motor strength normal upper and lower extremities, Sensory exam intact.  Psychiatric: Normal interaction, Coherent speech, Euthymic mood, Appropriate affect     *Lab results from 09/21/2023 were reviewed and discussed with patient and patient voices understanding.*    The 10-year ASCVD risk score (Hilario HOLLAND, et al., 2019) is: 0.8%    Values used to calculate the score:      Age: 43 years      Sex: Female      Is Non- : Yes      Diabetic: No      Tobacco smoker: No      Systolic Blood Pressure: 111 mmHg      Is BP treated: No      HDL Cholesterol: 39 mg/dL      Total Cholesterol: 139 mg/dL     Assessment:       ICD-10-CM ICD-9-CM   1. Wellness examination  Z00.00 V70.0   2. Visit for screening mammogram  Z12.31 V76.12   3. Screening for colon cancer  Z12.11 V76.51   4. Benign microscopic hematuria  R31.1 599.72        Plan:     Problem List Items Addressed This Visit    None  Visit Diagnoses       Wellness examination    -  Primary    Visit for screening mammogram        Relevant Orders    Mammo Digital Screening Bilat w/ Frank    Screening for colon cancer        Relevant Orders    Cologuard Screening (Multitarget Stool DNA)    Benign microscopic hematuria        Relevant Orders    Urinalysis, Reflex to Urine Culture         1. Wellness  examination  - Monthly breast self exam encouraged. Diet, exercise, and 10% weight loss encouraged. Keep appointment for dental exams x q6 months as scheduled. Keep appointment for annual eye exam as scheduled. Keep appointment with GYN for annual pap smear as scheduled. Notify M.D. or ER if temp greater than 100.4, or any acute illness.      2. Visit for screening mammogram  - Mammo Digital Screening Bilat w/ Frank; Future  - Mammo Digital Screening Bilat w/ Frank    3. Screening for colon cancer  - Cologuard Screening (Multitarget Stool DNA); Future  - Cologuard Screening (Multitarget Stool DNA)    4. Benign microscopic hematuria  - Urinalysis, Reflex to Urine Culture; Future  - Asymptomatic, recheck UA today, if still present, will proceed with CT abdomen/pelvis without contrast- stone study to evaluate for possible kidney stones. Notify M.D. or ER if symptoms persist or worsen, gross hematuria, vomiting, abdominal/pelvis pain, temp >100.4, or any acute illness.        Kathy was seen today for annual exam.    Diagnoses and all orders for this visit:    Wellness examination    Visit for screening mammogram  -     Mammo Digital Screening Bilat w/ Frank; Future  -     Mammo Digital Screening Bilat w/ Frank    Screening for colon cancer  -     Cologuard Screening (Multitarget Stool DNA); Future  -     Cologuard Screening (Multitarget Stool DNA)    Benign microscopic hematuria  -     Urinalysis, Reflex to Urine Culture; Future                 Follow up in about 1 year (around 9/25/2024) for Wellness.

## 2023-09-26 ENCOUNTER — TELEPHONE (OUTPATIENT)
Dept: FAMILY MEDICINE | Facility: CLINIC | Age: 43
End: 2023-09-26
Payer: COMMERCIAL

## 2023-09-26 DIAGNOSIS — R31.1 BENIGN MICROSCOPIC HEMATURIA: Primary | ICD-10-CM

## 2023-09-26 NOTE — TELEPHONE ENCOUNTER
----- Message from Judit Hodgson MD sent at 9/26/2023  8:29 AM CDT -----  She still has red blood cells in her urine, order for CT abdomen/pelvis without contrast is in file to evaluate for possible kidney stones, will proceed with Urology referral if needed pending CT results. Remaining urinalysis is clear.

## 2023-10-03 ENCOUNTER — PATIENT MESSAGE (OUTPATIENT)
Dept: ADMINISTRATIVE | Facility: HOSPITAL | Age: 43
End: 2023-10-03
Payer: COMMERCIAL

## 2023-10-10 ENCOUNTER — TELEPHONE (OUTPATIENT)
Dept: FAMILY MEDICINE | Facility: CLINIC | Age: 43
End: 2023-10-10
Payer: COMMERCIAL

## 2023-10-10 ENCOUNTER — HOSPITAL ENCOUNTER (OUTPATIENT)
Dept: RADIOLOGY | Facility: HOSPITAL | Age: 43
Discharge: HOME | End: 2023-10-10
Attending: FAMILY MEDICINE
Payer: COMMERCIAL

## 2023-10-10 DIAGNOSIS — N83.202 LEFT OVARIAN CYST: Primary | ICD-10-CM

## 2023-10-10 DIAGNOSIS — R31.1 BENIGN MICROSCOPIC HEMATURIA: ICD-10-CM

## 2023-10-10 PROCEDURE — 74176 CT ABD & PELVIS W/O CONTRAST: CPT | Mod: TC

## 2023-10-10 NOTE — TELEPHONE ENCOUNTER
----- Message from Judit Hodgson MD sent at 10/10/2023 12:16 PM CDT -----  CT abdomen/pelvis shows evidence of a left ovarian cyst, needs ultrasound pelvis for further evaluation, order is in file. Remaining CT abdomen/pelvis is normal.

## 2023-10-14 LAB — NONINV COLON CA DNA+OCC BLD SCRN STL QL: NEGATIVE

## 2023-10-16 ENCOUNTER — PATIENT MESSAGE (OUTPATIENT)
Dept: FAMILY MEDICINE | Facility: CLINIC | Age: 43
End: 2023-10-16
Payer: COMMERCIAL

## 2023-10-19 ENCOUNTER — TELEPHONE (OUTPATIENT)
Dept: FAMILY MEDICINE | Facility: CLINIC | Age: 43
End: 2023-10-19
Payer: COMMERCIAL

## 2023-10-19 ENCOUNTER — HOSPITAL ENCOUNTER (OUTPATIENT)
Dept: RADIOLOGY | Facility: HOSPITAL | Age: 43
Discharge: HOME | End: 2023-10-19
Attending: FAMILY MEDICINE
Payer: COMMERCIAL

## 2023-10-19 DIAGNOSIS — N83.202 LEFT OVARIAN CYST: ICD-10-CM

## 2023-10-19 PROCEDURE — 76856 US EXAM PELVIC COMPLETE: CPT | Mod: TC

## 2023-10-27 ENCOUNTER — OFFICE VISIT (OUTPATIENT)
Dept: FAMILY MEDICINE | Facility: CLINIC | Age: 43
End: 2023-10-27
Payer: COMMERCIAL

## 2023-10-27 VITALS — SYSTOLIC BLOOD PRESSURE: 130 MMHG | WEIGHT: 131 LBS | BODY MASS INDEX: 23.21 KG/M2 | DIASTOLIC BLOOD PRESSURE: 80 MMHG

## 2023-10-27 DIAGNOSIS — H02.843 SWELLING OF RIGHT EYELID: Primary | ICD-10-CM

## 2023-10-27 PROCEDURE — 3008F BODY MASS INDEX DOCD: CPT | Mod: CPTII,95,, | Performed by: FAMILY MEDICINE

## 2023-10-27 PROCEDURE — 99213 PR OFFICE/OUTPT VISIT, EST, LEVL III, 20-29 MIN: ICD-10-PCS | Mod: 95,,, | Performed by: FAMILY MEDICINE

## 2023-10-27 PROCEDURE — 3079F DIAST BP 80-89 MM HG: CPT | Mod: CPTII,95,, | Performed by: FAMILY MEDICINE

## 2023-10-27 PROCEDURE — 3075F PR MOST RECENT SYSTOLIC BLOOD PRESS GE 130-139MM HG: ICD-10-PCS | Mod: CPTII,95,, | Performed by: FAMILY MEDICINE

## 2023-10-27 PROCEDURE — 3044F PR MOST RECENT HEMOGLOBIN A1C LEVEL <7.0%: ICD-10-PCS | Mod: CPTII,95,, | Performed by: FAMILY MEDICINE

## 2023-10-27 PROCEDURE — 1159F PR MEDICATION LIST DOCUMENTED IN MEDICAL RECORD: ICD-10-PCS | Mod: CPTII,95,, | Performed by: FAMILY MEDICINE

## 2023-10-27 PROCEDURE — 1160F PR REVIEW ALL MEDS BY PRESCRIBER/CLIN PHARMACIST DOCUMENTED: ICD-10-PCS | Mod: CPTII,95,, | Performed by: FAMILY MEDICINE

## 2023-10-27 PROCEDURE — 3079F PR MOST RECENT DIASTOLIC BLOOD PRESSURE 80-89 MM HG: ICD-10-PCS | Mod: CPTII,95,, | Performed by: FAMILY MEDICINE

## 2023-10-27 PROCEDURE — 1159F MED LIST DOCD IN RCRD: CPT | Mod: CPTII,95,, | Performed by: FAMILY MEDICINE

## 2023-10-27 PROCEDURE — 3075F SYST BP GE 130 - 139MM HG: CPT | Mod: CPTII,95,, | Performed by: FAMILY MEDICINE

## 2023-10-27 PROCEDURE — 1160F RVW MEDS BY RX/DR IN RCRD: CPT | Mod: CPTII,95,, | Performed by: FAMILY MEDICINE

## 2023-10-27 PROCEDURE — 3044F HG A1C LEVEL LT 7.0%: CPT | Mod: CPTII,95,, | Performed by: FAMILY MEDICINE

## 2023-10-27 PROCEDURE — 99213 OFFICE O/P EST LOW 20 MIN: CPT | Mod: 95,,, | Performed by: FAMILY MEDICINE

## 2023-10-27 PROCEDURE — 3008F PR BODY MASS INDEX (BMI) DOCUMENTED: ICD-10-PCS | Mod: CPTII,95,, | Performed by: FAMILY MEDICINE

## 2023-10-27 RX ORDER — LEVOCETIRIZINE DIHYDROCHLORIDE 5 MG/1
5 TABLET, FILM COATED ORAL NIGHTLY
Qty: 30 TABLET | Refills: 2 | Status: SHIPPED | OUTPATIENT
Start: 2023-10-27 | End: 2024-01-25

## 2023-10-27 RX ORDER — METHYLPREDNISOLONE 4 MG/1
TABLET ORAL
Qty: 21 EACH | Refills: 0 | Status: SHIPPED | OUTPATIENT
Start: 2023-10-27 | End: 2023-11-17

## 2023-10-27 NOTE — PROGRESS NOTES
Patient ID: [id number]     Chief Complaint: Right eyelid swelling        HPI:     This is a telemedicine note. Patient was treated using telemedicine, real time audio and video, according to [facility] protocols. IJudit M.D. , conducted the visit from the [facility]. The patient participated in the visit at a non-[facility] location selected by the patient, identified below. I am licensed in the state where the patient stated they are located. The patient stated that they understood and accepted the privacy and security risks to their information at their location. This visit is not recorded.    Patient was located at the patient's home.     Kathy Mitchell is a 43 y.o. female here today for a telemedicine visit.    - Patient complains of right upper eyelid swelling x 3 days, reports that he eye is tearing, no itching. She denies visual disturbance or injury, but she does report during her eyebrow wax, some wax dripped in her upper eyelid. She denies pain, drainage, SOB, chest pain, fever, or chills. She has tried using a warm compress without success.   - Patient is without any other complaints today.         ----------------------------  Vitamin D deficiency     History reviewed. No pertinent surgical history.    Review of patient's allergies indicates:  No Known Allergies    No outpatient medications have been marked as taking for the 10/27/23 encounter (Office Visit) with Judit Hodgson MD.       Social History     Socioeconomic History    Marital status:    Tobacco Use    Smoking status: Never    Smokeless tobacco: Never   Substance and Sexual Activity    Alcohol use: Yes    Drug use: Never    Sexual activity: Yes        Family History   Problem Relation Age of Onset    Hypertension Father         Subjective:       See HPI for details    Constitutional: Denies Change in appetite. Denies Chills. Denies Fever. Denies Night sweats.  Eye: Denies Blurred vision. Denies Discharge.  Denies Eye pain.  ENT: Denies Decreased hearing. Denies Sore throat. Denies Swollen glands.  Respiratory: Denies Cough. Denies Shortness of breath. Denies Shortness of breath with exertion. Denies Wheezing.  Cardiovascular: Denies Chest pain at rest. Denies Chest pain with exertion. Denies Irregular heartbeat. Denies Palpitations.  Gastrointestinal: Denies Abdominal pain. Denies Diarrhea. Denies Nausea. Denies Vomiting. Denies Hematemesis or Hematochezia.  Genitourinary: Denies Dysuria. Denies Urinary frequency. Denies Urinary urgency. Denies Blood in urine.  Endocrine: Denies Cold intolerance. Denies Excessive thirst. Denies Heat intolerance. Denies Weight loss. Denies Weight gain.  Musculoskeletal: Denies Painful joints. Denies Weakness.  Integumentary: As per HPI. Denies Rash. Denies Itching. Denies Dry skin.  Neurologic: Denies Dizziness. Denies Fainting. Denies Headache.  Psychiatric: Denies Depression. Denies Anxiety. Denies Suicidal/Homicidal ideations.    All Other ROS: Negative except as stated in HPI.   Answers submitted by the patient for this visit:  Review of Systems Questionnaire (Submitted on 10/27/2023)  activity change: No  unexpected weight change: No  neck pain: No  hearing loss: No  rhinorrhea: No  trouble swallowing: No  eye discharge: No  visual disturbance: Yes  chest tightness: No  wheezing: No  chest pain: No  palpitations: No  blood in stool: No  constipation: No  vomiting: No  diarrhea: No  polydipsia: No  polyuria: No  difficulty urinating: No  hematuria: No  menstrual problem: No  dysuria: No  joint swelling: No  headaches: No  weakness: No  confusion: No  dysphoric mood: No      Objective:     /80 Comment: per patient  Wt 59.4 kg (131 lb)   BMI 23.21 kg/m²     Physical Exam    Physical Exam: LIMITED DUE TO TELEMEDICINE RESTRICTIONS.  General: Alert and oriented, No acute distress.  Head: Normocephalic, Atraumatic.  Eye: Sclera non-icteric. Right upper eyelid swelling, no  purulent drainage.   Neck/Thyroid:  Full range of motion.  Respiratory: Non-labored respirations, Symmetrical chest wall expansion.  Musculoskeletal: Normal range of motion.  Integumentary: Warm, Dry, Intact, No visible suspicious lesions or rashes. No diaphoresis.   Neurologic: No focal deficits  Psychiatric: Normal interaction, Coherent speech, Euthymic mood, Appropriate affect.       Assessment:       ICD-10-CM ICD-9-CM   1. Swelling of right eyelid  H02.843 374.82        Plan:     Problem List Items Addressed This Visit    None  Visit Diagnoses       Swelling of right eyelid    -  Primary    Relevant Medications    methylPREDNISolone (MEDROL DOSEPACK) 4 mg tablet    levocetirizine (XYZAL) 5 MG tablet         1. Swelling of right eyelid  - Rx trial of methylPREDNISolone (MEDROL DOSEPACK) 4 mg tablet; use as directed  Dispense: 21 each; Refill: 0  - Rx trial of levocetirizine (XYZAL) 5 MG tablet; Take 1 tablet (5 mg total) by mouth every evening.  Dispense: 30 tablet; Refill: 2  - Avoid contact with any potential irritant. If no improvement, will treat for possible cellulitis and consider referral to allergist for allergy testing.   - Notify M.D. or ER if symptoms persist or worsen, vision changes, purulent drainage, worsening erythema, SOB, chest pain, temp >100.4, or any acute illness.          Kathy was seen today for right eyelid swelling.    Diagnoses and all orders for this visit:    Swelling of right eyelid  -     methylPREDNISolone (MEDROL DOSEPACK) 4 mg tablet; use as directed  -     levocetirizine (XYZAL) 5 MG tablet; Take 1 tablet (5 mg total) by mouth every evening.                 Follow up if symptoms worsen or fail to improve.      Audio/Video Time Documentation:  Spent 13 minutes for telemedicine visit with successful audio/visual connection. [facility] was used for billing.

## 2023-10-27 NOTE — PATIENT INSTRUCTIONS
Wiley Chavez,     If you are due for any health screening(s) below please notify me so we can arrange them to be ordered and scheduled. Most healthy patients at your age complete them, but you are free to accept or refuse.     If you can't do it, I'll definitely understand. If you can, I'd certainly appreciate it!    All of your core healthy metrics are met.

## 2024-01-17 NOTE — TELEPHONE ENCOUNTER
----- Message from Judit Hodgson MD sent at 10/19/2023  4:27 PM CDT -----  US pelvis is normal. Previously seen left ovarian cyst no longer present.   No

## 2024-06-25 ENCOUNTER — ON-DEMAND VIRTUAL (OUTPATIENT)
Dept: URGENT CARE | Facility: CLINIC | Age: 44
End: 2024-06-25
Payer: COMMERCIAL

## 2024-06-25 DIAGNOSIS — H10.9 CONJUNCTIVITIS, UNSPECIFIED CONJUNCTIVITIS TYPE, UNSPECIFIED LATERALITY: Primary | ICD-10-CM

## 2024-06-25 PROCEDURE — 99213 OFFICE O/P EST LOW 20 MIN: CPT | Mod: CC,95,, | Performed by: FAMILY MEDICINE

## 2024-06-25 RX ORDER — ERYTHROMYCIN 5 MG/G
OINTMENT OPHTHALMIC
Qty: 1 G | Refills: 0 | Status: SHIPPED | OUTPATIENT
Start: 2024-06-25

## 2024-06-25 NOTE — PROGRESS NOTES
Subjective:      Patient ID: Kathy Mitchell is a 43 y.o. female.    Vitals:  vitals were not taken for this visit.     Chief Complaint: Eye Problem      Visit Type: TELE AUDIOVISUAL    Present with the patient at the time of consultation: TELEMED PRESENT WITH PATIENT: None  at home    Past Medical History:   Diagnosis Date    Vitamin D deficiency      History reviewed. No pertinent surgical history.  Review of patient's allergies indicates:  No Known Allergies  Current Outpatient Medications on File Prior to Visit   Medication Sig Dispense Refill    levocetirizine (XYZAL) 5 MG tablet Take 1 tablet (5 mg total) by mouth every evening. 30 tablet 2     No current facility-administered medications on file prior to visit.     Family History   Problem Relation Name Age of Onset    Hypertension Father         Medications Ordered                [id number] - [address] AT [facility]   [address] [place] [id number]    Telephone: [phone]   Fax: [phone]   Hours: Not open 24 hours                         E-Prescribed (1 of 1)              erythromycin (ROMYCIN) ophthalmic ointment    Si/2 inch ribbon to left 3 times daily and at bedtime       Start: 24     Quantity: 1 g Refills: 0                           Ohs Peq Odvv Intake    2024  5:33 AM CDT - Filed by Patient   What is your current physical address in the event of a medical emergency? [address]   Are you able to take your vital signs? No   Please attach any relevant images or files          43-year-old female who reports a 2 day history of right eye irritation, crusty drainage, and now swelling to right lower eye noted this morning.  She is a contact lens wear, but has not want them for 4 days.  Eye irritation is noted, but denies eyeball pain.  No vision change.  No recent URI symptoms.  No history of eye trauma.  No others with similar symptoms.        Constitution: Negative  for fever.        Objective:   The physical exam was conducted virtually.  Physical Exam   Constitutional: She is oriented to person, place, and time. She appears well-developed.  Non-toxic appearance. She does not appear ill. No distress.   HENT:   Head: Normocephalic and atraumatic.   Ears:   Right Ear: External ear normal.   Left Ear: External ear normal.   Eyes: Extraocular movement intact      Comments: Slight puffiness noted under right eye.  No eye guarding.  Mattery changes are noted in right lateral lashes.  Injected right lower conjunctival sac when compared to left.   Pulmonary/Chest: Effort normal. No stridor. No respiratory distress.   Abdominal: Normal appearance.   Neurological: She is alert and oriented to person, place, and time.   Skin: Skin is not diaphoretic.   Psychiatric: Her behavior is normal. Thought content normal.   Nursing note and vitals reviewed.      Assessment:     1. Conjunctivitis, unspecified conjunctivitis type, unspecified laterality        Plan:       Conjunctivitis, unspecified conjunctivitis type, unspecified laterality  -     erythromycin (ROMYCIN) ophthalmic ointment; 1/2 inch ribbon to left 3 times daily and at bedtime  Dispense: 1 g; Refill: 0    AS WE DISCUSSED, WITH ANY EYEBALL PAIN OR VISION CHANGE, YOU WILL NEED TO BE SEEN PROMPTLY IN PERSON, PREFERABLY BY YOUR EYE DOCTOR. OR NON IMPROVEMENT OF SYMPTOMS    DO NOT RESUME CONTACT LENSES UNTIL YOUR EYE SYMPTOMS ARE COMPLETELY RESOLVED FOR SEVERAL DAYS.    Make sure that you follow up with your primary care doctor in the next 2-5 days if needed .  Go to or return to Urgent Care if signs or symptoms change and certainly if you have worsening and/or severe symptoms go to the nearest emergency department for further evaluation.

## 2024-06-25 NOTE — PATIENT INSTRUCTIONS
AS WE DISCUSSED, WITH ANY EYEBALL PAIN OR VISION CHANGE, YOU WILL NEED TO BE SEEN PROMPTLY IN PERSON, PREFERABLY BY YOUR EYE DOCTOR. OR NON IMPROVEMENT OF SYMPTOMS    DO NOT RESUME CONTACT LENSES UNTIL YOUR EYE SYMPTOMS ARE COMPLETELY RESOLVED FOR SEVERAL DAYS.    Make sure that you follow up with your primary care doctor in the next 2-5 days if needed .  Go to or return to Urgent Care if signs or symptoms change and certainly if you have worsening and/or severe symptoms go to the nearest emergency department for further evaluation.

## 2024-09-24 ENCOUNTER — PATIENT MESSAGE (OUTPATIENT)
Dept: FAMILY MEDICINE | Facility: CLINIC | Age: 44
End: 2024-09-24
Payer: COMMERCIAL

## 2024-09-24 ENCOUNTER — DOCUMENTATION ONLY (OUTPATIENT)
Dept: FAMILY MEDICINE | Facility: CLINIC | Age: 44
End: 2024-09-24
Payer: COMMERCIAL

## 2024-09-24 LAB — BCS RECOMMENDATION EXT: NORMAL

## 2024-09-25 ENCOUNTER — LAB VISIT (OUTPATIENT)
Dept: LAB | Facility: HOSPITAL | Age: 44
End: 2024-09-25
Attending: FAMILY MEDICINE
Payer: COMMERCIAL

## 2024-09-25 DIAGNOSIS — Z00.00 WELLNESS EXAMINATION: Primary | ICD-10-CM

## 2024-09-25 DIAGNOSIS — Z00.00 WELLNESS EXAMINATION: ICD-10-CM

## 2024-09-25 LAB
ALBUMIN SERPL-MCNC: 4.1 G/DL (ref 3.5–5)
ALBUMIN/GLOB SERPL: 1.4 RATIO (ref 1.1–2)
ALP SERPL-CCNC: 61 UNIT/L (ref 40–150)
ALT SERPL-CCNC: 15 UNIT/L (ref 0–55)
ANION GAP SERPL CALC-SCNC: 9 MEQ/L
AST SERPL-CCNC: 14 UNIT/L (ref 5–34)
BACTERIA #/AREA URNS AUTO: NORMAL /HPF
BASOPHILS # BLD AUTO: 0.02 X10(3)/MCL
BASOPHILS NFR BLD AUTO: 0.2 %
BILIRUB SERPL-MCNC: 1.1 MG/DL
BILIRUB UR QL STRIP.AUTO: NEGATIVE
BUN SERPL-MCNC: 13 MG/DL (ref 7–18.7)
CALCIUM SERPL-MCNC: 9.5 MG/DL (ref 8.4–10.2)
CHLORIDE SERPL-SCNC: 106 MMOL/L (ref 98–107)
CHOLEST SERPL-MCNC: 143 MG/DL
CHOLEST/HDLC SERPL: 3 {RATIO} (ref 0–5)
CLARITY UR: CLEAR
CO2 SERPL-SCNC: 23 MMOL/L (ref 22–29)
COLOR UR AUTO: ABNORMAL
CREAT SERPL-MCNC: 0.89 MG/DL (ref 0.55–1.02)
CREAT/UREA NIT SERPL: 15
EOSINOPHIL # BLD AUTO: 0.09 X10(3)/MCL (ref 0–0.9)
EOSINOPHIL NFR BLD AUTO: 1 %
ERYTHROCYTE [DISTWIDTH] IN BLOOD BY AUTOMATED COUNT: 13.5 % (ref 11.5–17)
EST. AVERAGE GLUCOSE BLD GHB EST-MCNC: 93.9 MG/DL
GFR SERPLBLD CREATININE-BSD FMLA CKD-EPI: >60 ML/MIN/1.73/M2
GLOBULIN SER-MCNC: 3 GM/DL (ref 2.4–3.5)
GLUCOSE SERPL-MCNC: 95 MG/DL (ref 74–100)
GLUCOSE UR QL STRIP: NEGATIVE
HBA1C MFR BLD: 4.9 %
HCT VFR BLD AUTO: 37.9 % (ref 37–47)
HDLC SERPL-MCNC: 44 MG/DL (ref 35–60)
HGB BLD-MCNC: 12.5 G/DL (ref 12–16)
HGB UR QL STRIP: ABNORMAL
IMM GRANULOCYTES # BLD AUTO: 0.02 X10(3)/MCL (ref 0–0.04)
IMM GRANULOCYTES NFR BLD AUTO: 0.2 %
KETONES UR QL STRIP: NEGATIVE
LDLC SERPL CALC-MCNC: 78 MG/DL (ref 50–140)
LEUKOCYTE ESTERASE UR QL STRIP: NEGATIVE
LYMPHOCYTES # BLD AUTO: 2.55 X10(3)/MCL (ref 0.6–4.6)
LYMPHOCYTES NFR BLD AUTO: 29.4 %
MCH RBC QN AUTO: 29.9 PG (ref 27–31)
MCHC RBC AUTO-ENTMCNC: 33 G/DL (ref 33–36)
MCV RBC AUTO: 90.7 FL (ref 80–94)
MONOCYTES # BLD AUTO: 0.68 X10(3)/MCL (ref 0.1–1.3)
MONOCYTES NFR BLD AUTO: 7.8 %
NEUTROPHILS # BLD AUTO: 5.31 X10(3)/MCL (ref 2.1–9.2)
NEUTROPHILS NFR BLD AUTO: 61.4 %
NITRITE UR QL STRIP: NEGATIVE
PH UR STRIP: 6.5 [PH]
PLATELET # BLD AUTO: 259 X10(3)/MCL (ref 130–400)
PMV BLD AUTO: 9.1 FL (ref 7.4–10.4)
POTASSIUM SERPL-SCNC: 4 MMOL/L (ref 3.5–5.1)
PROT SERPL-MCNC: 7.1 GM/DL (ref 6.4–8.3)
PROT UR QL STRIP: NEGATIVE
RBC # BLD AUTO: 4.18 X10(6)/MCL (ref 4.2–5.4)
RBC #/AREA URNS AUTO: NORMAL /HPF
SODIUM SERPL-SCNC: 138 MMOL/L (ref 136–145)
SP GR UR STRIP.AUTO: <=1.005 (ref 1–1.03)
SQUAMOUS #/AREA URNS AUTO: NORMAL /HPF
TRIGL SERPL-MCNC: 106 MG/DL (ref 37–140)
TSH SERPL-ACNC: 1.09 UIU/ML (ref 0.35–4.94)
UROBILINOGEN UR STRIP-ACNC: 0.2
VLDLC SERPL CALC-MCNC: 21 MG/DL
WBC # BLD AUTO: 8.67 X10(3)/MCL (ref 4.5–11.5)
WBC #/AREA URNS AUTO: NORMAL /HPF

## 2024-09-25 PROCEDURE — 36415 COLL VENOUS BLD VENIPUNCTURE: CPT

## 2024-09-25 PROCEDURE — 85025 COMPLETE CBC W/AUTO DIFF WBC: CPT

## 2024-09-25 PROCEDURE — 83036 HEMOGLOBIN GLYCOSYLATED A1C: CPT

## 2024-09-25 PROCEDURE — 81003 URINALYSIS AUTO W/O SCOPE: CPT

## 2024-09-25 PROCEDURE — 80053 COMPREHEN METABOLIC PANEL: CPT

## 2024-09-25 PROCEDURE — 82306 VITAMIN D 25 HYDROXY: CPT

## 2024-09-25 PROCEDURE — 81001 URINALYSIS AUTO W/SCOPE: CPT

## 2024-09-25 PROCEDURE — 80061 LIPID PANEL: CPT

## 2024-09-25 PROCEDURE — 84443 ASSAY THYROID STIM HORMONE: CPT

## 2024-09-26 ENCOUNTER — TELEPHONE (OUTPATIENT)
Dept: FAMILY MEDICINE | Facility: CLINIC | Age: 44
End: 2024-09-26

## 2024-09-26 ENCOUNTER — OFFICE VISIT (OUTPATIENT)
Dept: FAMILY MEDICINE | Facility: CLINIC | Age: 44
End: 2024-09-26
Payer: COMMERCIAL

## 2024-09-26 ENCOUNTER — PATIENT MESSAGE (OUTPATIENT)
Dept: FAMILY MEDICINE | Facility: CLINIC | Age: 44
End: 2024-09-26

## 2024-09-26 ENCOUNTER — E-CONSULT (OUTPATIENT)
Dept: UROLOGY | Facility: CLINIC | Age: 44
End: 2024-09-26
Payer: COMMERCIAL

## 2024-09-26 VITALS
OXYGEN SATURATION: 98 % | HEART RATE: 76 BPM | DIASTOLIC BLOOD PRESSURE: 77 MMHG | BODY MASS INDEX: 24.63 KG/M2 | HEIGHT: 63 IN | RESPIRATION RATE: 16 BRPM | WEIGHT: 139 LBS | SYSTOLIC BLOOD PRESSURE: 112 MMHG

## 2024-09-26 DIAGNOSIS — E55.9 VITAMIN D DEFICIENCY: Primary | ICD-10-CM

## 2024-09-26 DIAGNOSIS — R31.1 BENIGN MICROSCOPIC HEMATURIA: Primary | ICD-10-CM

## 2024-09-26 DIAGNOSIS — R31.1 BENIGN MICROSCOPIC HEMATURIA: ICD-10-CM

## 2024-09-26 DIAGNOSIS — Z00.00 WELLNESS EXAMINATION: Primary | ICD-10-CM

## 2024-09-26 DIAGNOSIS — R31.29 MICROHEMATURIA: Primary | ICD-10-CM

## 2024-09-26 DIAGNOSIS — Z00.00 LABORATORY EXAMINATION ORDERED AS PART OF A ROUTINE GENERAL MEDICAL EXAMINATION: Primary | ICD-10-CM

## 2024-09-26 LAB — 25(OH)D3+25(OH)D2 SERPL-MCNC: 25 NG/ML (ref 30–80)

## 2024-09-26 PROCEDURE — 3044F HG A1C LEVEL LT 7.0%: CPT | Mod: CPTII,,, | Performed by: FAMILY MEDICINE

## 2024-09-26 PROCEDURE — 1159F MED LIST DOCD IN RCRD: CPT | Mod: CPTII,,, | Performed by: FAMILY MEDICINE

## 2024-09-26 PROCEDURE — 1160F RVW MEDS BY RX/DR IN RCRD: CPT | Mod: CPTII,,, | Performed by: FAMILY MEDICINE

## 2024-09-26 PROCEDURE — 3008F BODY MASS INDEX DOCD: CPT | Mod: CPTII,,, | Performed by: FAMILY MEDICINE

## 2024-09-26 PROCEDURE — 3074F SYST BP LT 130 MM HG: CPT | Mod: CPTII,,, | Performed by: FAMILY MEDICINE

## 2024-09-26 PROCEDURE — 99396 PREV VISIT EST AGE 40-64: CPT | Mod: ,,, | Performed by: FAMILY MEDICINE

## 2024-09-26 PROCEDURE — 3078F DIAST BP <80 MM HG: CPT | Mod: CPTII,,, | Performed by: FAMILY MEDICINE

## 2024-09-26 RX ORDER — CHOLECALCIFEROL (VITAMIN D3) 50 MCG
1 TABLET ORAL DAILY
Qty: 90 TABLET | Refills: 3 | COMMUNITY
Start: 2024-09-26 | End: 2025-09-26

## 2024-09-26 NOTE — TELEPHONE ENCOUNTER
----- Message from Gloria Pratt sent at 9/26/2024  2:01 PM CDT -----  Who Called: Kathy Mitchell    Patient is returning phone call    Who Left Message for Patient: Ashleigh Hughes LPN  Does the patient know what this is regarding?:      Preferred Method of Contact: Phone Call  Patient's Preferred Phone Number on File: [phone]   Best Call Back Number, if different:  Additional Information: Pt requesting phone call.

## 2024-09-26 NOTE — TELEPHONE ENCOUNTER
----- Message from Judit Hodgson MD sent at 9/26/2024 12:36 PM CDT -----  Vitamin D is mildly low, 25, normal: 30-80, needs an over the counter Vitamin D3 supplement 2,000 units by mouth daily and get 15 minutes of sunlight daily wearing sunscreen, recheck Vitamin D level with annual wellness labs.

## 2024-09-26 NOTE — CONSULTS
[facility] 4th Fl  Response for E-Consult     Patient Name: Kathy Mtichell  MRN: [id number]  Primary Care Provider: Judit Hodgson MD   Requesting Provider: Judit Hodgson, *  Consults    After evaluation of your eConsult clinical questions, I believe the patient should be scheduled for an office visit in our specialty due to the need to discuss cystoscopy.  She also needs additional imaging of her kidneys as she has only had a non contrast CT which is insufficient to evaluate hematuria.    Total time of Consultation: 5 minute    *This eConsult is based on the clinical data available to me and is furnished without benefit of a physician examination.  The eConsult will need to be interpreted in light of any clinical issues of changes in patient status not available to me at the time rime of filing this eConsults.  Significant changes in patient condition of level of acuity should result in a referral for in person consultation and reevaluation.  Please alert me if you have any furth questions.     Thank you for this eConsult referral.     MD [facility] 4th [facility]

## 2024-09-26 NOTE — PROGRESS NOTES
Patient ID: [id number]     Chief Complaint: Annual Exam        HPI:     Kathy Mitchell is a 44 y.o. female here today for annual wellness exam.   Well Adult History   The patient presents for well adult exam. The patient's general health status is described as good. The patient's diet is described as balanced. Exercise: occasional. Associated symptoms consist of denies weight loss, denies weight gain, denies fatigue, denies headache, denies hearing loss and denies vision changes. Last menstrual period: Has Mirena IUD followed by GYN. Additional pertinent history: last dental exam: goes every 6 months, last eye exam: > 1 year ago (wears contacts/eye glasses, she has vision insurance, she will schedule an appointment next available), last [facility] was [facility]23/2024 at [facility], last pap smear : 12/14/2023 (WNL with GYN- Dr. Chidi Akins), Last Cologuard: 10/09/2023, negative, due in 10/2026, she denies family history of colon cancer or colon polyps, seat belt use, occasional caffeine use, tobacco use none, social alcohol use and She had labs done on 09/25/2024, here to discuss the results today. She is not interested in STD screening today. She is UTD on vaccines. She refuses Tdap, COVID-19 booster, and flu vaccine today, risks of refusal discussed with patient and patient voices understanding. She has asymptomatic microscopic hematuria, had NL CT abdomen/pelvis in 10/2023, she is open to Urology e-consult.   - Patient is without any other complaints today.      Advance Care Planning     Date: 09/26/2024  Patient did not wish or was not able to name a surrogate decision maker or provide an Advance Care Plan.            -------------------------------------    Vitamin D deficiency        History reviewed. No pertinent surgical history.    Review of patient's allergies indicates:  No Known Allergies    No outpatient medications have been marked as taking for the 9/26/24 encounter (Office Visit) with  "Judit Hodgson MD.       Social History     Socioeconomic History    Marital status:    Tobacco Use    Smoking status: Never    Smokeless tobacco: Never   Substance and Sexual Activity    Alcohol use: Yes    Drug use: Never    Sexual activity: Yes     Partners: Male     Birth control/protection: I.U.D.        Family History   Problem Relation Name Age of Onset    Hypertension Father          Subjective:       Review of Systems:    See HPI for details    Constitutional: Denies Change in appetite. Denies Chills. Denies Fever. Denies Night sweats.  Eye: Denies Blurred vision. Denies Discharge. Denies Eye pain.  ENT: Denies Decreased hearing. Denies Sore throat. Denies Swollen glands.  Respiratory: Denies Cough. Denies Shortness of breath. Denies Shortness of breath with exertion. Denies Wheezing.  Cardiovascular: Denies Chest pain at rest. Denies Chest pain with exertion. Denies Irregular heartbeat. Denies Palpitations.  Gastrointestinal: Denies Abdominal pain. Denies Diarrhea. Denies Nausea. Denies Vomiting. Denies Hematemesis or Hematochezia.  Genitourinary: Denies Dysuria. Denies Urinary frequency. Denies Urinary urgency. Denies Blood in urine.  Endocrine: Denies Cold intolerance. Denies Excessive thirst. Denies Heat intolerance. Denies Weight loss. Denies Weight gain.  Musculoskeletal: Denies Painful joints. Denies Weakness.  Integumentary: Denies Rash. Denies Itching. Denies Dry skin.  Neurologic: Denies Dizziness. Denies Fainting. Denies Headache.  Psychiatric: Denies Depression. Denies Anxiety. Denies Suicidal/Homicidal ideations.    All Other ROS: Negative except as stated in HPI.       Objective:     /77 (BP Location: Right arm, Patient Position: Sitting, BP Method: Large (Automatic))   Pulse 76   Resp 16   Ht 5' 3" (1.6 m)   Wt 63 kg (139 lb)   LMP  (LMP Unknown)   SpO2 98%   BMI 24.62 kg/m²     Physical Exam    General: Alert and oriented, No acute distress.  Head: Normocephalic, " Atraumatic.  Eye: Pupils are equal, round and reactive to light, Extraocular movements are intact, Sclera non-icteric.  Ears/Nose/Throat: Normal, Mucosa moist,Clear.  Neck/Thyroid: Supple, Non-tender, No carotid bruit, No palpable thyromegaly or thyroid nodule, No lymphadenopathy, No JVD, Full range of motion.  Respiratory: Clear to auscultation bilaterally; No wheezes, rales or rhonchi,Non-labored respirations, Symmetrical chest wall expansion.  Cardiovascular: Regular rate and rhythm, S1/S2 normal, No murmurs, rubs or gallops.  Gastrointestinal: Soft, Non-tender, Non-distended, Normal bowel sounds, No palpable organomegaly.  Musculoskeletal: Normal range of motion.  Integumentary: Warm, Dry, Intact, No suspicious lesions or rashes.  Extremities: No clubbing, cyanosis or edema  Neurologic: No focal deficits, Cranial Nerves II-XII are grossly intact, Motor strength normal upper and lower extremities, Sensory exam intact.  Psychiatric: Normal interaction, Coherent speech, Euthymic mood, Appropriate affect     *Lab results from 09/25/2024 were reviewed and discussed with patient and patient voices understanding.*      The 10-year ASCVD risk score (Hilario DK, et al., 2019) is: 0.7%    Values used to calculate the score:      Age: 44 years      Sex: Female      Is Non- : Yes      Diabetic: No      Tobacco smoker: No      Systolic Blood Pressure: 112 mmHg      Is BP treated: No      HDL Cholesterol: 44 mg/dL      Total Cholesterol: 143 mg/dL     Assessment:       ICD-10-CM ICD-9-CM   1. Wellness examination  Z00.00 V70.0   2. Benign microscopic hematuria  R31.1 599.72        Plan:     Problem List Items Addressed This Visit    None  Visit Diagnoses       Wellness examination    -  Primary    Relevant Orders    CBC Auto Differential    Comprehensive Metabolic Panel    Hemoglobin A1C    Lipid Panel    TSH    Vitamin D    Urinalysis, Reflex to Urine Culture    Benign microscopic hematuria         Relevant Orders    E-Consult to Urology         1. Wellness examination  - CBC Auto Differential; Future in 09/2025  - Comprehensive Metabolic Panel; Future in 09/2025  - Hemoglobin A1C; Future in 09/2025  - Lipid Panel; Future in 09/2025  - TSH; Future in 09/2025  - Vitamin D; Future in 09/2025  - Urinalysis, Reflex to Urine Culture; Future in 09/2025  - Monthly breast self exam encouraged. Diet, exercise, and 10% weight loss encouraged. Keep appointment for dental exams x q6 months as scheduled. Keep appointment for annual eye exam as scheduled. Keep appointment with GYN for annual pap smear as scheduled. Notify M.D. or ER if temp greater than 100.4, or any acute illness.       2. Benign microscopic hematuria, asymptomatic.  - Avoid bladder irritants.   - E-Consult to Urology for evaluation and treatment  - Notify M.D. or ER if symptoms persist or worsen, gross hematuria, abdominal pain, vomiting, temp >100.4, or any acute illness.          Kathy was seen today for annual exam.    Diagnoses and all orders for this visit:    Wellness examination  -     CBC Auto Differential; Future  -     Comprehensive Metabolic Panel; Future  -     Hemoglobin A1C; Future  -     Lipid Panel; Future  -     TSH; Future  -     Vitamin D; Future  -     Urinalysis, Reflex to Urine Culture; Future    Benign microscopic hematuria  -     E-Consult to Urology          Medication List with Changes/Refills   Current Medications    ERYTHROMYCIN (ROMYCIN) OPHTHALMIC OINTMENT    1/2 inch ribbon to left 3 times daily and at bedtime       Start Date: 6/25/2024 End Date: --          Follow up in about 1 year (around 9/26/2025) for Wellness.

## 2024-09-26 NOTE — TELEPHONE ENCOUNTER
----- Message from Royal Chatterjee sent at 9/25/2024  4:59 PM CDT -----  .Type:  Needs Medical Advice    Who Called: Kathy  Symptoms (please be specific):    How long has patient had these symptoms:    Pharmacy name and phone #:    Would the patient rather a call back or a response via [facility]?   Best Call Back Number:  [phone]  Additional Information: Patient called at 4:57 pm told me she was at [facility] to do blood work and no orders were there even though she spoke with someone in the office yesterday and they told they would be there. Please call her back tomorrow. Thanks,

## 2025-06-28 ENCOUNTER — E-VISIT (OUTPATIENT)
Dept: FAMILY MEDICINE | Facility: CLINIC | Age: 45
End: 2025-06-28
Payer: COMMERCIAL

## 2025-06-28 DIAGNOSIS — R39.9 UTI SYMPTOMS: Primary | ICD-10-CM

## 2025-06-28 RX ORDER — NITROFURANTOIN 25; 75 MG/1; MG/1
100 CAPSULE ORAL EVERY 12 HOURS
Qty: 14 CAPSULE | Refills: 0 | Status: SHIPPED | OUTPATIENT
Start: 2025-06-28 | End: 2025-07-05

## 2025-06-28 RX ORDER — PHENAZOPYRIDINE HYDROCHLORIDE 200 MG/1
200 TABLET, FILM COATED ORAL
Qty: 6 TABLET | Refills: 0 | Status: SHIPPED | OUTPATIENT
Start: 2025-06-28 | End: 2025-06-30

## 2025-06-28 NOTE — PROGRESS NOTES
Patient ID: Kathy Mitchell is a 44 y.o. female.    Chief Complaint: General Illness (Entered automatically based on patient selection in [employer].)    The patient initiated a request through [employer] on 6/28/2025 for evaluation and management with a chief complaint of General Illness (Entered automatically based on patient selection in [employer].)     I evaluated the questionnaire submission on 06/28/2025.    [facility]    6/28/2025  1:56 PM CDT - Filed by Patient   What do you need help with? Urinary Symptoms   Do you agree to participate in an E-Visit? Yes   If you have any of the following symptoms, please go to the nearest emergency room or call 911: I acknowledge   Do you have any of the following pregnancy-related conditions? (Pregnant, Possibly pregnant, Breast feeding, None) None   What is the main issue you would like addressed today? Slight burning and oder during urination   Do you have any of the following symptoms? ( Discomfort or pain passing urine, Passing urine more frequently, Cloudy urine, Discharge in urine, Other, None) Cloudy urine   When did your concern begin? 6/26/2025   What medications or treatments have you used to help your symptoms? (Antibiotics, Cranberry products, Drinking more fluids, Painkillers, Other, None) Drinking more fluids   What does your urine look like? (Clear, Cloudy, Dark yellow, Light yellow, Pink or red (bloody), Foamy, Not sure) Cloudy   Have you had any of the following symptoms during the past 24 hours?   Urgency (a sudden and uncontrollable urge to urinate) (None, Mild, Moderate, Severe) Mild   Frequency (going to the toilet very often) (None, Mild, Moderate, Severe) Mild   Burning pain when urinating (None, Mild, Moderate, Severe) Moderate   Incomplete bladder emptying (still feel like you need to urinate again after urination) (None, Mild, Moderate, Severe) None   Pain in the lower belly when youre not urinating. (None, Mild,  Moderate, Severe) None   Please rate how much discomfort these symptoms have caused in the last 24 hours. (None, Mild, Moderate, Severe) None   Have you had any of the following symptoms during the past 24 hours?   Blood seen in the urine (None, Mild, Moderate, Severe) None   Pain in the lower back on one or both sides (flank pain) (None, Mild, Moderate, Severe) None   Abnormal Vaginal Discharge (abnormal amount, color and/or odor) (None, Mild, Moderate, Severe) Moderate   Discharge from the opening you urinate from (urethra) when not urinating. (None, Mild, Moderate, Severe) None   Tell us how the symptoms have interfered with your every day activities/work in the past 24 hours. (None, Mild, Moderate, Severe) None   Do you have a fever? (Less than 100.4°F, 100.4°F or greater, No, Not sure) No   Are you a diabetic? No   If you are female, please tell us if you have any of the following right now (as youre completing the questionnaire): (Menstrual period (menses), PMS (Premenstrual syndrome),Signs of menopause such as hot flashes, Pregnancy, None) None   Do you have a history of kidney stones? No   Provide any information you feel is important to your history not asked above    Please attach any relevant images or files (if you have performed a home test for UTI, please submit a photo of results)    Are you able to take your vitals? No         Encounter Diagnosis   Name Primary?    UTI symptoms Yes        Orders Placed This Encounter   Procedures    Urine Culture High Risk     Standing Status:   Future     Expected Date:   6/28/2025     Expiration Date:   9/26/2026    Urinalysis     Standing Status:   Future     Expected Date:   6/28/2025     Expiration Date:   8/27/2026     Collection Type:   Urine, Clean Catch      Medications Ordered This Encounter   Medications    nitrofurantoin, macrocrystal-monohydrate, (MACROBID) 100 MG capsule     Sig: Take 1 capsule (100 mg total) by mouth every 12 (twelve) hours. for 7  days     Dispense:  14 capsule     Refill:  0    phenazopyridine (PYRIDIUM) 200 MG tablet     Sig: Take 1 tablet (200 mg total) by mouth 3 (three) times daily with meals. For bladder pain for 2 days     Dispense:  6 tablet     Refill:  0      I ordered a urinalysis and urine culture to evaluate for possible urinary tract infection, she can have this testing done at any [facility] facility of her choice and at her earliest convenience, except for Urgent Care Centers. I recommend she have this done prior to starting the medication that way if she has a bladder infection, I can capture it before the antibiotics mask a possible infection. I did send a prescription for an antibiotic called Macrobid and Pyridium to help ease her bladder tract. UTI precautions encouraged. The Pyridium may change the color of your urine to orange or blue, please do not be alarmed as this is coming from the medication. Increase fluid intake. Notify M.D. or ER if symptoms persist or worsen, abdominal pain, bloody urine, vomiting, vaginal discharge, temp >100.4, or any acute illness.      Here are some evidence-based strategies to prevent UTIs:   Hydration:   · Drink plenty of fluids, especially water, throughout the day. This helps dilute urine and flush out bacteria.   Hygiene:   · Wipe from front to back after using the bathroom.  · Wash the genital area regularly with soap and water.  · Avoid using douches, sprays, or powders in the genital area.   Urination Habits:   · Urinate frequently, especially after sexual intercourse.  · Empty the bladder completely when you urinate.  · Avoid holding urine for long periods.   Dietary Modifications:   · Consume cranberry juice or cranberry supplements. Cranberries contain proanthocyanidins, which may help prevent bacteria from adhering to the bladder wall.   · Consider taking vitamin C supplements, as it may help acidify the urine, making it less hospitable to bacteria.   Other Measures:   · Use a  heating pad on the lower abdomen to increase blood flow and help fight infection.   · Wear breathable cotton underwear.   · Avoid using tampons or sanitary pads for extended periods.   · Take probiotics, which may help boost the immune system and fight off bacteria.        Follow up if symptoms worsen or fail to improve.      E-Visit Time Tracking:    Day 1 Time (in minutes): 6    Total Time (in minutes): 6